# Patient Record
Sex: MALE | Race: BLACK OR AFRICAN AMERICAN | NOT HISPANIC OR LATINO | Employment: OTHER | URBAN - METROPOLITAN AREA
[De-identification: names, ages, dates, MRNs, and addresses within clinical notes are randomized per-mention and may not be internally consistent; named-entity substitution may affect disease eponyms.]

---

## 2024-08-06 ENCOUNTER — TELEPHONE (OUTPATIENT)
Dept: TRANSPLANT | Facility: CLINIC | Age: 59
End: 2024-08-06

## 2024-08-06 DIAGNOSIS — I50.9 CONGESTIVE HEART FAILURE, UNSPECIFIED HF CHRONICITY, UNSPECIFIED HEART FAILURE TYPE: Primary | ICD-10-CM

## 2024-09-10 ENCOUNTER — HOSPITAL ENCOUNTER (OUTPATIENT)
Dept: CARDIOLOGY | Facility: HOSPITAL | Age: 59
Discharge: HOME | End: 2024-09-10
Attending: INTERNAL MEDICINE
Payer: MEDICAID

## 2024-09-10 ENCOUNTER — OFFICE VISIT (OUTPATIENT)
Dept: TRANSPLANT | Facility: CLINIC | Age: 59
End: 2024-09-10
Attending: INTERNAL MEDICINE
Payer: MEDICAID

## 2024-09-10 VITALS
WEIGHT: 149.25 LBS | HEART RATE: 68 BPM | HEIGHT: 69 IN | SYSTOLIC BLOOD PRESSURE: 134 MMHG | DIASTOLIC BLOOD PRESSURE: 90 MMHG | BODY MASS INDEX: 22.11 KG/M2

## 2024-09-10 VITALS
HEIGHT: 69 IN | SYSTOLIC BLOOD PRESSURE: 127 MMHG | BODY MASS INDEX: 20.57 KG/M2 | WEIGHT: 138.88 LBS | HEART RATE: 60 BPM | DIASTOLIC BLOOD PRESSURE: 82 MMHG

## 2024-09-10 DIAGNOSIS — I50.9 CONGESTIVE HEART FAILURE, UNSPECIFIED HF CHRONICITY, UNSPECIFIED HEART FAILURE TYPE: ICD-10-CM

## 2024-09-10 DIAGNOSIS — I50.22 CHRONIC SYSTOLIC CONGESTIVE HEART FAILURE: Primary | ICD-10-CM

## 2024-09-10 DIAGNOSIS — I42.8 NICM (NONISCHEMIC CARDIOMYOPATHY): ICD-10-CM

## 2024-09-10 DIAGNOSIS — J44.9 COPD MIXED TYPE: ICD-10-CM

## 2024-09-10 DIAGNOSIS — F17.200 NEEDS SMOKING CESSATION EDUCATION: ICD-10-CM

## 2024-09-10 DIAGNOSIS — C61 PROSTATE CANCER: ICD-10-CM

## 2024-09-10 DIAGNOSIS — I10 PRIMARY HYPERTENSION: ICD-10-CM

## 2024-09-10 LAB
AORTIC ROOT ANNULUS: 2.2 CM
AV INDEX (PROSTH): 0.65
AV MEAN GRADIENT: 4 MMHG
AV PEAK GRADIENT: 8 MMHG
AV VALVE AREA BY VELOCITY RATIO: 3.28 CM²
AV VALVE AREA: 3.12 CM²
AV VELOCITY RATIO: 0.68
BSA FOR ECHO PROCEDURE: 1.75 M2
CV ECHO LV RWT: 0.24 CM
DOP CALC AO PEAK VEL: 1.37 M/S
DOP CALC AO VTI: 23.55 CM
DOP CALC LVOT AREA: 4.8 CM2
DOP CALC LVOT DIAMETER: 2.48 CM
DOP CALC LVOT PEAK VEL: 0.93 M/S
DOP CALC LVOT STROKE VOLUME: 73.48 CM3
DOP CALCLVOT PEAK VEL VTI: 15.22 CM
E WAVE DECELERATION TIME: 190.76 MSEC
E/A RATIO: 3.78
E/E' RATIO: 13.6 M/S
ECHO LV POSTERIOR WALL: 0.79 CM (ref 0.6–1.1)
FRACTIONAL SHORTENING: 14 % (ref 28–44)
INTERVENTRICULAR SEPTUM: 0.79 CM (ref 0.6–1.1)
IVC DIAMETER: 2.2 CM
IVRT: 68.51 MSEC
LA MAJOR: 5.47 CM
LA MINOR: 5.87 CM
LA WIDTH: 5.01 CM
LEFT ATRIUM SIZE: 4.79 CM
LEFT ATRIUM VOLUME INDEX MOD: 45.4 ML/M2
LEFT ATRIUM VOLUME INDEX: 65.3 ML/M2
LEFT ATRIUM VOLUME MOD: 80.33 CM3
LEFT ATRIUM VOLUME: 115.51 CM3
LEFT INTERNAL DIMENSION IN SYSTOLE: 5.74 CM (ref 2.1–4)
LEFT VENTRICLE DIASTOLIC VOLUME INDEX: 130.62 ML/M2
LEFT VENTRICLE DIASTOLIC VOLUME: 231.19 ML
LEFT VENTRICLE MASS INDEX: 126 G/M2
LEFT VENTRICLE SYSTOLIC VOLUME INDEX: 91.8 ML/M2
LEFT VENTRICLE SYSTOLIC VOLUME: 162.49 ML
LEFT VENTRICULAR INTERNAL DIMENSION IN DIASTOLE: 6.7 CM (ref 3.5–6)
LEFT VENTRICULAR MASS: 222.66 G
LV LATERAL E/E' RATIO: 11.33 M/S
LV SEPTAL E/E' RATIO: 17 M/S
MV A" WAVE DURATION": 23.69 MSEC
MV PEAK A VEL: 0.27 M/S
MV PEAK E VEL: 1.02 M/S
PISA MRMAX VEL: 0.05 M/S
PISA TR MAX VEL: 3.66 M/S
PULM VEIN S/D RATIO: 0.71
PV PEAK D VEL: 0.82 M/S
PV PEAK S VEL: 0.58 M/S
RA MAJOR: 5.43 CM
RA PRESSURE ESTIMATED: 8 MMHG
RA WIDTH: 4.7 CM
RIGHT VENTRICLE DIASTOLIC BASEL DIMENSION: 3.6 CM
RV TB RVSP: 12 MMHG
SINUS: 3.25 CM
STJ: 2.49 CM
TDI LATERAL: 0.09 M/S
TDI SEPTAL: 0.06 M/S
TDI: 0.08 M/S
TR MAX PG: 54 MMHG
TRICUSPID ANNULAR PLANE SYSTOLIC EXCURSION: 2.16 CM
TV REST PULMONARY ARTERY PRESSURE: 62 MMHG
Z-SCORE OF LEFT VENTRICULAR DIMENSION IN END DIASTOLE: 3.14
Z-SCORE OF LEFT VENTRICULAR DIMENSION IN END SYSTOLE: 5.06

## 2024-09-10 PROCEDURE — 4010F ACE/ARB THERAPY RXD/TAKEN: CPT | Mod: CPTII,,, | Performed by: INTERNAL MEDICINE

## 2024-09-10 PROCEDURE — 99205 OFFICE O/P NEW HI 60 MIN: CPT | Mod: S$PBB,,, | Performed by: INTERNAL MEDICINE

## 2024-09-10 PROCEDURE — 3080F DIAST BP >= 90 MM HG: CPT | Mod: CPTII,,, | Performed by: INTERNAL MEDICINE

## 2024-09-10 PROCEDURE — 99999 PR PBB SHADOW E&M-EST. PATIENT-LVL III: CPT | Mod: PBBFAC,,, | Performed by: INTERNAL MEDICINE

## 2024-09-10 PROCEDURE — 3008F BODY MASS INDEX DOCD: CPT | Mod: CPTII,,, | Performed by: INTERNAL MEDICINE

## 2024-09-10 PROCEDURE — 99213 OFFICE O/P EST LOW 20 MIN: CPT | Mod: PBBFAC,25 | Performed by: INTERNAL MEDICINE

## 2024-09-10 PROCEDURE — 1160F RVW MEDS BY RX/DR IN RCRD: CPT | Mod: CPTII,,, | Performed by: INTERNAL MEDICINE

## 2024-09-10 PROCEDURE — 1159F MED LIST DOCD IN RCRD: CPT | Mod: CPTII,,, | Performed by: INTERNAL MEDICINE

## 2024-09-10 PROCEDURE — 93306 TTE W/DOPPLER COMPLETE: CPT

## 2024-09-10 PROCEDURE — 93306 TTE W/DOPPLER COMPLETE: CPT | Mod: 26,,, | Performed by: STUDENT IN AN ORGANIZED HEALTH CARE EDUCATION/TRAINING PROGRAM

## 2024-09-10 PROCEDURE — 3075F SYST BP GE 130 - 139MM HG: CPT | Mod: CPTII,,, | Performed by: INTERNAL MEDICINE

## 2024-09-10 RX ORDER — BUMETANIDE 2 MG/1
2 TABLET ORAL 2 TIMES DAILY
COMMUNITY
Start: 2024-08-02

## 2024-09-10 RX ORDER — METOLAZONE 2.5 MG/1
2.5 TABLET ORAL
COMMUNITY
Start: 2024-08-02

## 2024-09-10 RX ORDER — IPRATROPIUM BROMIDE 0.5 MG/2.5ML
500 SOLUTION RESPIRATORY (INHALATION) EVERY 4 HOURS PRN
COMMUNITY

## 2024-09-10 RX ORDER — SPIRONOLACTONE 50 MG/1
50 TABLET, FILM COATED ORAL 2 TIMES DAILY
Qty: 60 TABLET | Refills: 5 | Status: SHIPPED | OUTPATIENT
Start: 2024-09-10

## 2024-09-10 RX ORDER — SACUBITRIL AND VALSARTAN 97; 103 MG/1; MG/1
1 TABLET, FILM COATED ORAL 2 TIMES DAILY
COMMUNITY

## 2024-09-10 RX ORDER — ISOSORBIDE DINITRATE 20 MG/1
20 TABLET ORAL 3 TIMES DAILY
Qty: 90 TABLET | Refills: 3 | Status: SHIPPED | OUTPATIENT
Start: 2024-09-10

## 2024-09-10 RX ORDER — COLCHICINE 0.6 MG/1
0.6 TABLET ORAL
COMMUNITY

## 2024-09-10 RX ORDER — POTASSIUM CHLORIDE 1500 MG/1
20 TABLET, EXTENDED RELEASE ORAL 2 TIMES DAILY
COMMUNITY
Start: 2024-08-02

## 2024-09-10 RX ORDER — NAPROXEN SODIUM 220 MG/1
81 TABLET, FILM COATED ORAL DAILY
COMMUNITY
End: 2024-09-10

## 2024-09-10 RX ORDER — CARVEDILOL 25 MG/1
25 TABLET ORAL 2 TIMES DAILY
COMMUNITY

## 2024-09-10 RX ORDER — ALBUTEROL SULFATE 90 UG/1
2 AEROSOL, METERED RESPIRATORY (INHALATION) 4 TIMES DAILY
COMMUNITY
Start: 2024-04-16

## 2024-09-10 RX ORDER — DAPAGLIFLOZIN 10 MG/1
10 TABLET, FILM COATED ORAL DAILY
COMMUNITY
Start: 2024-08-02

## 2024-09-10 RX ORDER — HYDRALAZINE HYDROCHLORIDE 50 MG/1
50 TABLET, FILM COATED ORAL 3 TIMES DAILY
Qty: 90 TABLET | Refills: 3 | Status: SHIPPED | OUTPATIENT
Start: 2024-09-10

## 2024-09-10 RX ORDER — SPIRONOLACTONE 50 MG/1
50 TABLET, FILM COATED ORAL DAILY
COMMUNITY
End: 2024-09-10 | Stop reason: SDUPTHER

## 2024-09-10 RX ORDER — OXYCODONE AND ACETAMINOPHEN 10; 325 MG/1; MG/1
1 TABLET ORAL
COMMUNITY

## 2024-09-10 RX ORDER — ALBUTEROL SULFATE 0.83 MG/ML
2.5 SOLUTION RESPIRATORY (INHALATION) EVERY 4 HOURS PRN
COMMUNITY
Start: 2024-03-06

## 2024-09-10 RX ORDER — AMIODARONE HYDROCHLORIDE 200 MG/1
200 TABLET ORAL DAILY
COMMUNITY
Start: 2024-08-02 | End: 2025-08-02

## 2024-09-10 NOTE — PATIENT INSTRUCTIONS
Increase the spironolactone to 1 tablet twice a day    Stop aspirin    Stop smoking and do not use nicotine gum or patches.  If nicotine detected in the blood then unable to evaluate you for a heart transplant for 6 months.  The tests are so sensitive do not even go by anyone who smokes    Reduce alcohol but eliminating a half a bottle of wine every week until off    Start hydralazine half of a 50 mg tablet three time a day for 2 weeks and then whole tablet three times a day    Start isosorbide half of a 20 mg tablet three time a day for 2 weeks and then whole tablet three times a day    I will make arrangements for you to return on these medication for tests to assess your risk profile

## 2024-09-15 PROBLEM — J44.1 COPD EXACERBATION: Status: ACTIVE | Noted: 2024-09-15

## 2024-09-15 PROBLEM — J44.9 COPD MIXED TYPE: Status: ACTIVE | Noted: 2024-09-15

## 2024-09-15 PROBLEM — I42.8 NICM (NONISCHEMIC CARDIOMYOPATHY): Status: ACTIVE | Noted: 2024-09-15

## 2024-09-15 PROBLEM — C61 PROSTATE CANCER: Status: ACTIVE | Noted: 2024-09-15

## 2024-09-15 PROBLEM — I50.22 CHRONIC SYSTOLIC CONGESTIVE HEART FAILURE: Status: ACTIVE | Noted: 2024-09-15

## 2024-09-15 PROBLEM — I10 PRIMARY HYPERTENSION: Status: ACTIVE | Noted: 2024-09-15

## 2024-09-16 NOTE — PROGRESS NOTES
Subjective:   Initial evaluation of heart transplant candidacy.    HPI:  Mr. Arizmendi is a 59 y.o. year old Black or  male who has presents to be considered for advanced surgical options (LVAD/OHT) upon referral from Dr. Segura, his cardiologist.  He has NICM and CHF dx in 2019.  He has RAND < 1 block, sleeps on 2 pillows and occ awakens with SOB, some swelling at end of day.  He has angio without CAD 2024.  He has COPD under care of pulmonologist at home.     Past medical history  Prostate CA 2017  CHF 2019  NICM (no CAD at cath 2024)  Labile HBP on treatment since 2022  COPD    Operations  Prostatectomy 2017  ICD 2023    Social history  Smoker since age 14 avg 1 ppd until this year though he continues to smoke few cigarettes/day  Alcohol since age 17 drinks 1-2 bottles of wine per day  Previously worked but not now though they c=give varying estimates of when he stopped    Family history  Negative for CHF    Current Outpatient Medications on File Prior to Visit   Medication Sig Dispense Refill    Aspirin 325 mg 1 daily      albuterol (PROVENTIL) 2.5 mg /3 mL (0.083 %) nebulizer solution Inhale 2.5 mg into the lungs every 4 (four) hours as needed.      amiodarone (PACERONE) 200 MG Tab Take 200 mg by mouth once daily.      bumetanide (BUMEX) 2 MG tablet Take 2 mg by mouth 2 (two) times a day.      carvediloL (COREG) 25 MG tablet Take 25 mg by mouth 2 (two) times daily.      colchicine (COLCRYS) 0.6 mg tablet Take 0.6 mg by mouth as needed. Take 2 with gout flare followed by 1 tablet 1 hr later      dapagliflozin propanediol (FARXIGA) 10 mg tablet Take 10 mg by mouth Daily.      ipratropium (ATROVENT) 0.02 % nebulizer solution Take 500 mcg by nebulization every 4 (four) hours as needed for Wheezing. Rescue      metOLazone (ZAROXOLYN) 2.5 MG tablet Take 2.5 mg by mouth every Monday. 30 min before bumetanide      oxyCODONE-acetaminophen (PERCOCET)  mg per tablet Take 1 tablet by mouth as needed.    "   potassium chloride (K-TAB) 20 mEq Take 20 mEq by mouth 2 (two) times a day.      sacubitriL-valsartan (ENTRESTO)  mg per tablet Take 1 tablet by mouth 2 (two) times daily.      VENTOLIN HFA 90 mcg/actuation inhaler Inhale 2 puffs into the lungs 4 (four) times daily.      vericiguat 10 mg Tab Take 10 mg by mouth.       Allergies  flexeril    Review of Systems   Constitutional: Positive for malaise/fatigue and weight loss.   Cardiovascular:  Positive for dyspnea on exertion, leg swelling (end of day), orthopnea (2 pillows) and paroxysmal nocturnal dyspnea (wakes up SOB at times). Negative for chest pain, near-syncope, palpitations and syncope.   Respiratory:  Positive for cough, shortness of breath and sputum production.    Hematologic/Lymphatic: Does not bruise/bleed easily.   Neurological:  Negative for brief paralysis, dizziness, focal weakness and light-headedness.       Objective:   Blood pressure (!) 134/90, pulse 68, height 5' 9" (1.753 m), weight 67.7 kg (149 lb 4 oz).body mass index is 22.04 kg/m².    Physical Exam  Constitutional:       Appearance: He is well-developed.      Comments: BP (!) 134/90 (BP Location: Left arm, Patient Position: Sitting, BP Method: Medium (Automatic))   Pulse 68   Ht 5' 9" (1.753 m)   Wt 67.7 kg (149 lb 4 oz)   BMI 22.04 kg/m²    HENT:      Head: Normocephalic and atraumatic.   Eyes:      General: No scleral icterus.        Right eye: No discharge.         Left eye: No discharge.      Conjunctiva/sclera: Conjunctivae normal.   Neck:      Thyroid: No thyromegaly.      Vascular: No JVD.      Trachea: No tracheal deviation.   Cardiovascular:      Rate and Rhythm: Normal rate and regular rhythm.      Heart sounds: No murmur heard.     No gallop.      Comments: Distant heart tones  Pulmonary:      Effort: Pulmonary effort is normal.      Comments: Quiet BS  Abdominal:      General: Bowel sounds are normal. There is no distension.      Palpations: Abdomen is soft. There is " no mass.      Tenderness: There is no abdominal tenderness. There is no guarding or rebound.   Musculoskeletal:         General: No swelling or tenderness.      Right lower leg: No edema.      Left lower leg: No edema.   Skin:     General: Skin is warm and dry.   Neurological:      General: No focal deficit present.      Mental Status: He is alert and oriented to person, place, and time. Mental status is at baseline.      Comments: Grossly intact   Psychiatric:         Mood and Affect: Mood normal.         Behavior: Behavior normal.         Thought Content: Thought content normal.         Judgment: Judgment normal.         Labs reviewed as follows:  Lab Results   Component Value Date    BNP 1,192 (H) 09/10/2024     09/10/2024    K 3.4 (L) 09/10/2024    MG 2.1 09/10/2024     (H) 09/10/2024    CO2 26 09/10/2024    BUN 11 09/10/2024    CREATININE 0.9 09/10/2024    GLU 88 09/10/2024    HGBA1C 5.4 06/24/2022    AST 15 09/10/2024    ALT 11 09/10/2024    ALBUMIN 3.6 09/10/2024    PROT 6.5 09/10/2024    BILITOT 0.8 09/10/2024    TSH 0.786 09/10/2024    CHOL 144 09/29/2023    HDL 75 09/29/2023    LDLCALC 56 09/29/2023    TRIG 62 09/29/2023     Assessment:      1. Chronic systolic congestive heart failure    2. NICM (nonischemic cardiomyopathy)    3. Primary hypertension    4. COPD mixed type    5. Prostate cancer        Plan:   Increase the spironolactone to 25 mg twice a day    Stop aspirin    Stop smoking and do not use nicotine gum or patches.  If nicotine detected in the blood then unable to evaluate you for a heart transplant for 6 months.  The tests are so sensitive do not even go by anyone who smokes    Reduce alcohol but eliminating a half a bottle of wine every week until off    Start hydralazine half of a 50 mg tablet three time a day for 2 weeks and then whole tablet three times a day    Start isosorbide half of a 20 mg tablet three time a day for 2 weeks and then whole tablet three times a day    RTC  4-6 weeks for risk stratification with EKG, CPX and RHC and also obtain CBC, BMP, BNP, iron, TIBC, % sat and ferritin    Patient is now NYHA III ACC stage D    Recommend 2 gram sodium restriction and 1500cc fluid restriction.  Encourage physical activity with graded exercise program.  Requested patient to weigh themselves daily, and to notify us if their weight increases by more than 3 lbs in 1 day or 5 lbs in 1 week.     Transplant Candidacy: Patient is a 59 y.o. year old male with heart failure is being seen for possible LVAD and OHT. In my opinion, he is  an unacceptable OHT candidate due to smoking but might be candidate for LVAD--would like to see him tapered off alcohol or at least substantial reduction. Patient did not meet with MCS and/or pre-transplant coordinator at the end of this visit as they wanted to get on the road with impending storm. he is scheduled for risk stratification testing with CPX/RHC.     Discussed with the patient and family [facility] program outcomes as reported in INTERMACS (Interagency Registry for Mechanically Assisted Circulatory Support):    1 year survival = 92.7%  2 year survival = 86.7%  3 year survival = 86.7%    Patient and family acknowledged receipt of this information and all questions were answered.     Patient advised that it is recommended that all transplant candidates, and their close contacts and household members receive Covid vaccination.    UNOS Patient Status  Functional Status: 50% - Requires considerable assistance and frequent medical care  Physical Capacity: No Limitations  Working for Income: No  If no, reason not working: Unknown    Advance Care Planning     Date: 09/10/2024    Power of   I initiated the process of voluntary advance care planning today and explained the importance of this process to the patient.  I introduced the concept of advance directives to the patient, as well. Then the patient received detailed  information about the importance of designating a Health Care Power of  (HCPOA). He was also instructed to communicate with this person about their wishes for future healthcare, should he become sick and lose decision-making capacity. The patient has not previously appointed a HCPOA but will do so at next visit.      A total of 5 min was spent on advance care planning, goals of care discussion, emotional support, formulating and communicating prognosis and exploring burden/benefit of various approaches of treatment. This discussion occurred on a fully voluntary basis with the verbal consent of the patient and/or family.       Cj Rudd Jr, MD

## 2024-09-24 ENCOUNTER — CLINICAL SUPPORT (OUTPATIENT)
Dept: SMOKING CESSATION | Facility: CLINIC | Age: 59
End: 2024-09-24
Payer: COMMERCIAL

## 2024-09-24 DIAGNOSIS — F17.200 NICOTINE DEPENDENCE: Primary | ICD-10-CM

## 2024-09-24 PROCEDURE — 99404 PREV MED CNSL INDIV APPRX 60: CPT | Mod: S$GLB,,, | Performed by: GENERAL PRACTICE

## 2024-09-24 PROCEDURE — 99999 PR PBB SHADOW E&M-EST. PATIENT-LVL II: CPT | Mod: PBBFAC,,,

## 2024-09-24 RX ORDER — MICONAZOLE NITRATE 2 %
2 CREAM (GRAM) TOPICAL
Qty: 220 EACH | Refills: 0 | Status: SHIPPED | OUTPATIENT
Start: 2024-09-24

## 2024-09-24 RX ORDER — IBUPROFEN 200 MG
1 TABLET ORAL DAILY
Qty: 28 PATCH | Refills: 0 | Status: SHIPPED | OUTPATIENT
Start: 2024-09-24

## 2024-10-08 ENCOUNTER — CLINICAL SUPPORT (OUTPATIENT)
Dept: SMOKING CESSATION | Facility: CLINIC | Age: 59
End: 2024-10-08
Payer: COMMERCIAL

## 2024-10-08 DIAGNOSIS — F17.200 NICOTINE DEPENDENCE: ICD-10-CM

## 2024-10-08 PROCEDURE — 99999 PR PBB SHADOW E&M-EST. PATIENT-LVL II: CPT | Mod: PBBFAC,,,

## 2024-10-08 PROCEDURE — 99404 PREV MED CNSL INDIV APPRX 60: CPT | Mod: S$GLB,,, | Performed by: GENERAL PRACTICE

## 2024-10-08 RX ORDER — IBUPROFEN 200 MG
1 TABLET ORAL DAILY
Qty: 28 PATCH | Refills: 0 | Status: SHIPPED | OUTPATIENT
Start: 2024-10-08

## 2024-10-08 NOTE — PROGRESS NOTES
Individual Follow-Up Form    10/8/2024    Quit Date: TBD    Clinical Status of Patient: Outpatient    Length of Service: 60 minutes    Continuing Medication: yes  Patches    Other Medications: nicotine gum     Target Symptoms: Withdrawal and medication side effects. The following were  rated moderate (3) to severe (4) on TCRS:  Moderate (3): none  Severe (4): none    Comments: Patient was seen in the clinic today for a smoking cessation progress update. He has reduced his smoking and was smoking 20 cpd. Commended patient on his progress toward quitting tobacco use. Exhaled CO 16 ppm. Patient states the nicotine patch and gum has been helping to decrease his craving to smoke. The apartment complex where he lives someone is always smoking outside and asking for a cigarette and makes it difficult to avoid being around smoke. Discussed the decision to quit, setting a quit date, tobacco dependence, nicotine vs. habit, triggers and cues, consequences of tobacco use, personal reasons for quitting, and strategies to eliminate tobacco use. The patient remains on the prescribed tobacco cessation medication regimen of 21 mg nicotine patch QD and 2 mg nicotine gum as needed without any negative side effects at this time.   Challenged patient to attempt 24 hours without smoking and not buy any more cigarettes. The patient denies any abnormal behavioral or mental changes at this time.  Will continue to encourage and monitor his progress.     Diagnosis: F17.200    Next Visit: 2 weeks

## 2024-10-16 ENCOUNTER — HOSPITAL ENCOUNTER (OUTPATIENT)
Dept: CARDIOLOGY | Facility: HOSPITAL | Age: 59
Discharge: HOME | End: 2024-10-16
Attending: INTERNAL MEDICINE
Payer: COMMERCIAL

## 2024-10-16 ENCOUNTER — HOSPITAL ENCOUNTER (OUTPATIENT)
Dept: CARDIOLOGY | Facility: CLINIC | Age: 59
Discharge: HOME | End: 2024-10-16
Attending: INTERNAL MEDICINE
Payer: COMMERCIAL

## 2024-10-16 VITALS
BODY MASS INDEX: 21.92 KG/M2 | SYSTOLIC BLOOD PRESSURE: 135 MMHG | DIASTOLIC BLOOD PRESSURE: 88 MMHG | HEART RATE: 60 BPM | HEIGHT: 69 IN | WEIGHT: 148 LBS

## 2024-10-16 DIAGNOSIS — I50.22 CHRONIC SYSTOLIC CONGESTIVE HEART FAILURE: ICD-10-CM

## 2024-10-16 LAB
CV STRESS BASE HR: 60 BPM
DIASTOLIC BLOOD PRESSURE: 88 MMHG
OHS CV CPX 1 MINUTE RECOVERY HEART RATE: 69 BPM
OHS CV CPX 85 PERCENT MAX PREDICTED HEART RATE MALE: 136
OHS CV CPX DATA GRADE - PEAK: 1.1
OHS CV CPX DATA O2 SAT - PEAK: 99
OHS CV CPX DATA O2 SAT - REST: 100
OHS CV CPX DATA SPEED - PEAK: 1.3
OHS CV CPX DATA TIME - PEAK: 1.85
OHS CV CPX DATA VE/VCO2 - PEAK: 46
OHS CV CPX DATA VE/VO2 - PEAK: 32
OHS CV CPX DATA VO2 - PEAK: 12.5
OHS CV CPX DATA VO2 - REST: 4.5
OHS CV CPX FEV1/FVC: 0.45
OHS CV CPX FORCED EXPIRATORY VOLUME: 1.23
OHS CV CPX FORCED VITAL CAPACITY (FVC): 2.76
OHS CV CPX HIGHEST VO: 33.1
OHS CV CPX MAX PREDICTED HEART RATE: 160
OHS CV CPX MAXIMAL VOLUNTARY VENTILATION (MVV) PREDICTED: 49.2
OHS CV CPX MAXIMAL VOLUNTARY VENTILATION (MVV): 37
OHS CV CPX MAXIUMUM EXERCISE VENTILATION (VE MAX): 13.6
OHS CV CPX PATIENT AGE: 60
OHS CV CPX PATIENT HEIGHT IN: 69
OHS CV CPX PATIENT IS FEMALE AGE 11-19: 0
OHS CV CPX PATIENT IS FEMALE AGE GREATER THAN 19: 0
OHS CV CPX PATIENT IS FEMALE AGE LESS THAN 11: 0
OHS CV CPX PATIENT IS FEMALE: 0
OHS CV CPX PATIENT IS MALE AGE 11-25: 0
OHS CV CPX PATIENT IS MALE AGE GREATER THAN 25: 1
OHS CV CPX PATIENT IS MALE AGE LESS THAN 11: 0
OHS CV CPX PATIENT IS MALE GREATER THAN 18: 1
OHS CV CPX PATIENT IS MALE LESS THAN OR EQUAL TO 18: 0
OHS CV CPX PATIENT IS MALE: 1
OHS CV CPX PATIENT WEIGHT RETURNED IN OZ: 2368
OHS CV CPX PEAK DIASTOLIC BLOOD PRESSURE: 82 MMHG
OHS CV CPX PEAK HEAR RATE: 98 BPM
OHS CV CPX PEAK RATE PRESSURE PRODUCT: NORMAL
OHS CV CPX PEAK SYSTOLIC BLOOD PRESSURE: 126 MMHG
OHS CV CPX PERCENT BODY FAT: 8.5
OHS CV CPX PERCENT MAX PREDICTED HEART RATE ACHIEVED: 61
OHS CV CPX PREDICTED VO2: 33.1 ML/KG/MIN
OHS CV CPX RATE PRESSURE PRODUCT PRESENTING: 8100
OHS CV CPX REST PET CO2: 26
OHS CV CPX VE/VCO2 SLOPE: 41.8
OHS QRS DURATION: 96 MS
OHS QTC CALCULATION: 476 MS
STRESS ECHO POST EXERCISE DUR MIN: 1 MINUTES
STRESS ECHO POST EXERCISE DUR SEC: 51 SECONDS
SYSTOLIC BLOOD PRESSURE: 135 MMHG

## 2024-10-16 PROCEDURE — 94621 CARDIOPULM EXERCISE TESTING: CPT

## 2024-10-16 PROCEDURE — 94621 CARDIOPULM EXERCISE TESTING: CPT | Mod: 26,,, | Performed by: INTERNAL MEDICINE

## 2024-10-16 PROCEDURE — 93005 ELECTROCARDIOGRAM TRACING: CPT | Mod: S$GLB,,, | Performed by: INTERNAL MEDICINE

## 2024-10-16 PROCEDURE — 93010 ELECTROCARDIOGRAM REPORT: CPT | Mod: S$GLB,,, | Performed by: INTERNAL MEDICINE

## 2024-10-18 ENCOUNTER — TELEPHONE (OUTPATIENT)
Dept: TRANSPLANT | Facility: CLINIC | Age: 59
End: 2024-10-18
Payer: COMMERCIAL

## 2024-10-18 ENCOUNTER — PATIENT MESSAGE (OUTPATIENT)
Dept: TRANSPLANT | Facility: CLINIC | Age: 59
End: 2024-10-18
Payer: COMMERCIAL

## 2024-10-18 NOTE — TELEPHONE ENCOUNTER
NN spoke with patient regarding his [employer] insurance plan being out of network and the need to cancel upcoming RHC appt. Patient states his insurance just changed to a [employer] plan, that he no longer has [employer] coverage. Asked patient to send insurance documentation in patient portal so that we could verify coverage.     NN updated JOSE Segovia, of potential insurance change. We will hold of on cancelling his RHC scheduled for 10/25 while we sort out insurance coverage. If his new plan is in network, we will proceed with appts.

## 2024-10-18 NOTE — TELEPHONE ENCOUNTER
Per Dr. Rudd, capture txp referral and request financial clearance for consult.     Patient is scheduled to have RHC done 10/25/2024.

## 2024-10-21 ENCOUNTER — TELEPHONE (OUTPATIENT)
Dept: TRANSPLANT | Facility: CLINIC | Age: 59
End: 2024-10-21
Payer: COMMERCIAL

## 2024-10-21 NOTE — TELEPHONE ENCOUNTER
Patient provided new insurance card information via patient portal. JOSE Segovia reviewed coverage. [employer] plan is still showing as eligible/active, and [facility] is out of network. Per FC, will need to cancel future appts at [facility] for the time being. Will plan to reassess coverage on 11/1 to see if [employer] is no longer active.     Called patient to inform him of insurance coverage issues and our plan to reassess on 11/1. Explained that at this time, [facility] is out of network and he would be a self-pay for his appts. To avoid excessive cost for the patient, we are going to cancel his upcoming appts for now with hopes to be able to reschedule in the future. If new [facility] plan shows as active and [employer] is inactive, we can then proceed with rescheduling [facility] appts. Pt verbalized understanding.

## 2024-10-22 ENCOUNTER — CLINICAL SUPPORT (OUTPATIENT)
Dept: SMOKING CESSATION | Facility: CLINIC | Age: 59
End: 2024-10-22
Payer: COMMERCIAL

## 2024-10-22 DIAGNOSIS — F17.200 NICOTINE DEPENDENCE: Primary | ICD-10-CM

## 2024-10-22 PROCEDURE — 99999 PR PBB SHADOW E&M-EST. PATIENT-LVL II: CPT | Mod: PBBFAC,,,

## 2024-10-22 PROCEDURE — 99404 PREV MED CNSL INDIV APPRX 60: CPT | Mod: S$GLB,,, | Performed by: GENERAL PRACTICE

## 2024-10-22 NOTE — PROGRESS NOTES
Individual Follow-Up Form    10/22/2024    Quit Date: TBD    Clinical Status of Patient: Outpatient    Length of Service: 60 minutes    Continuing Medication: yes  Patches    Other Medications: nicotine gum     Target Symptoms: Withdrawal and medication side effects. The following were  rated moderate (3) to severe (4) on TCRS:  Moderate (3): none  Severe (4): none    Comments: Patient was seen in the clinic today for a smoking cessation follow up visit. He is smoking 3-4 cpd and was smoking 20 cpd. Commended patient on his progress towards quitting tobacco use. Exhaled CO is 7 ppm. He has not smoked a cigarette today. He smoked the last cigarette yesterday. He has not been feeling well with cough and congestion and that has been making him not feel like smoking. Patient states that he only uses the 21 mg nicotine patch on some days because he forgets to wear it with no side effects. He continues to use 2 mg nicotine gum as needed with no side effects at this time. Completion of TCRS (Tobacco Cessation Rating Scale) reviewed strategies, cues, and triggers. Introduced the negative impact of tobacco on health, the health advantages of discontinuing the use of tobacco, time line improved health changes after a quit, withdrawal issues to expect from nicotine and habit, and ways to achieve the goal of a quit. Challenged patient to attempt 24 hours without smoking and set a quit date. Discussed consistent use of nicotine patch. The patient denies any abnormal behavioral or mental changes at this time.  Will continue to encourage and monitor his progress.     Diagnosis: F17.200    Next Visit: 2 weeks

## 2024-11-01 ENCOUNTER — TELEPHONE (OUTPATIENT)
Dept: TRANSPLANT | Facility: CLINIC | Age: 59
End: 2024-11-01
Payer: MEDICAID

## 2024-11-01 ENCOUNTER — TELEPHONE (OUTPATIENT)
Dept: ADMINISTRATIVE | Facility: HOSPITAL | Age: 59
End: 2024-11-01
Payer: MEDICAID

## 2024-11-01 ENCOUNTER — PATIENT MESSAGE (OUTPATIENT)
Dept: TRANSPLANT | Facility: CLINIC | Age: 59
End: 2024-11-01
Payer: MEDICAID

## 2024-11-05 ENCOUNTER — CLINICAL SUPPORT (OUTPATIENT)
Dept: SMOKING CESSATION | Facility: CLINIC | Age: 59
End: 2024-11-05
Payer: COMMERCIAL

## 2024-11-05 DIAGNOSIS — F17.200 NICOTINE DEPENDENCE: Primary | ICD-10-CM

## 2024-11-05 PROCEDURE — 99999 PR PBB SHADOW E&M-EST. PATIENT-LVL II: CPT | Mod: PBBFAC,TXP,,

## 2024-11-05 PROCEDURE — 99404 PREV MED CNSL INDIV APPRX 60: CPT | Mod: S$GLB,TXP,, | Performed by: GENERAL PRACTICE

## 2024-11-05 RX ORDER — IBUPROFEN 200 MG
1 TABLET ORAL DAILY
Qty: 14 PATCH | Refills: 0 | Status: SHIPPED | OUTPATIENT
Start: 2024-11-05

## 2024-11-05 NOTE — PROGRESS NOTES
Individual Follow-Up Form    11/5/2024    Quit Date: 11/1/2024    Clinical Status of Patient: Outpatient    Length of Service: 60 minutes    Continuing Medication: yes  Patches    Other Medications: nicotine gum     Target Symptoms: Withdrawal and medication side effects. The following were  rated moderate (3) to severe (4) on TCRS:  Moderate (3): none  Severe (4): none    Comments: Patient was seen in the clinic today for a smoking cessation follow up visit. He is tobacco free at this time and was smoking 20 cpd. Congratulated patient on his accomplishment. Patient states he has been having a cold and that has helped him to not want to smoke. Exhaled CO 6 ppm. He is no longer buying cigarettes and the people in his apartment complex have noticed he is not smoking so they have stopped asking him for cigarettes. He is no longer having strong cravings to smoke. Completion of TCRS (Tobacco Cessation Rating Scale) reviewed strategies, controlling environment, cues, triggers, new goals set. Introduced high risk situations with preparation interventions, caffeine similarities with withdrawal issues of habit and nicotine, alcohol, understanding urges, cravings, stress and relaxation. Open discussion with intervention discussion. Discussed lowering nicotine patch dose to 14 mg. The patient remains on the prescribed tobacco cessation medication regimen of 21 mg nicotine patch QD and 2 mg nicotine gum as needed without any negative side effects at this time.  The patient denies any abnormal behavioral or mental changes at this time.  Will continue to encourage and monitor his progress.     Diagnosis: F17.200    Next Visit: 2 weeks

## 2024-11-18 ENCOUNTER — TELEPHONE (OUTPATIENT)
Dept: SMOKING CESSATION | Facility: CLINIC | Age: 59
End: 2024-11-18
Payer: MEDICAID

## 2024-11-18 NOTE — TELEPHONE ENCOUNTER
Attempted to reach patient in regard to missed smoking cessation appointment. Left voicemail with return contact information.

## 2024-11-25 ENCOUNTER — CLINICAL SUPPORT (OUTPATIENT)
Dept: SMOKING CESSATION | Facility: CLINIC | Age: 59
End: 2024-11-25
Payer: COMMERCIAL

## 2024-11-25 DIAGNOSIS — F17.200 NICOTINE DEPENDENCE: Primary | ICD-10-CM

## 2024-11-25 PROCEDURE — 99402 PREV MED CNSL INDIV APPRX 30: CPT | Mod: S$GLB,TXP,, | Performed by: GENERAL PRACTICE

## 2024-11-25 PROCEDURE — 99999 PR PBB SHADOW E&M-EST. PATIENT-LVL II: CPT | Mod: PBBFAC,TXP,,

## 2024-11-25 RX ORDER — NICOTINE 7MG/24HR
1 PATCH, TRANSDERMAL 24 HOURS TRANSDERMAL DAILY
Qty: 14 PATCH | Refills: 0 | Status: SHIPPED | OUTPATIENT
Start: 2024-11-25

## 2024-11-25 NOTE — PROGRESS NOTES
Individual Follow-Up Form    11/25/2024    Quit Date: 11/1/24    Clinical Status of Patient: Outpatient    Length of Service: 30 minutes    Continuing Medication: yes  Patches    Other Medications: nicotine gum     Target Symptoms: Withdrawal and medication side effects. The following were  rated moderate (3) to severe (4) on TCRS:  Moderate (3): none  Severe (4): none    Comments: Spoke to patient over the phone today in regard to his smoking cessation progress. He is tobacco free at this time. Patient states he has smoked every now and then, but is now smoke free. Congratulated patient on quitting tobacco use and encouraged him to continue with the progress he has made. He is no longer having strong cravings to smoke. Patient states he had a visit with his cardiologist last week and they advised him to stop smoking for his heart health. Discussed relapse prevention and healthy substitutions. The patient remains on the prescribed tobacco cessation medication regimen of 14 mg nicotine patch QD and 2 mg nicotine gum as needed without any negative side effects at this time.  Discussed lowering nicotine patch dose to 7 mg today. The patient denies any abnormal behavioral or mental changes at this time.  Will continue to encourage and monitor his progress.    Diagnosis: F17.200    Next Visit: 2 weeks

## 2024-12-04 ENCOUNTER — HOSPITAL ENCOUNTER (OUTPATIENT)
Facility: HOSPITAL | Age: 59
Discharge: HOME | End: 2024-12-04
Attending: INTERNAL MEDICINE | Admitting: INTERNAL MEDICINE
Payer: MEDICAID

## 2024-12-04 VITALS
HEART RATE: 66 BPM | BODY MASS INDEX: 21.91 KG/M2 | TEMPERATURE: 99 F | RESPIRATION RATE: 18 BRPM | WEIGHT: 147.94 LBS | DIASTOLIC BLOOD PRESSURE: 85 MMHG | OXYGEN SATURATION: 99 % | SYSTOLIC BLOOD PRESSURE: 140 MMHG | HEIGHT: 69 IN

## 2024-12-04 DIAGNOSIS — I50.9 CONGESTIVE HEART FAILURE, UNSPECIFIED HF CHRONICITY, UNSPECIFIED HEART FAILURE TYPE: ICD-10-CM

## 2024-12-04 PROCEDURE — C1751 CATH, INF, PER/CENT/MIDLINE: HCPCS | Mod: TXP | Performed by: INTERNAL MEDICINE

## 2024-12-04 PROCEDURE — 93451 RIGHT HEART CATH: CPT | Mod: 26,NTX,, | Performed by: INTERNAL MEDICINE

## 2024-12-04 PROCEDURE — C1894 INTRO/SHEATH, NON-LASER: HCPCS | Mod: TXP | Performed by: INTERNAL MEDICINE

## 2024-12-04 PROCEDURE — 93451 RIGHT HEART CATH: CPT | Mod: TXP | Performed by: INTERNAL MEDICINE

## 2024-12-04 NOTE — H&P
[facility] - Short Stay Cardiac Unit  Interventional Cardiology  H&P    Patient Name: Kuldeep Arizmendi  MRN: [id number]  Admission Date: 12/4/2024  Code Status: No Order   Attending Provider: Laron Juares, *   Primary Care Physician: No, Primary Doctor  Principal Problem:<principal problem not specified>    Patient information was obtained from ER records.     Subjective:     Chief Complaint:  dyspnea     HPI: patient with HF here for rhc.      Past Medical History:   Diagnosis Date    Chronic systolic congestive heart failure 09/15/2024    COPD mixed type 09/15/2024    Gout, unspecified     NICM (nonischemic cardiomyopathy) 09/15/2024    Primary hypertension 09/15/2024    Prostate cancer 09/15/2024       Past Surgical History:   Procedure Laterality Date    INSERTION OF GENERATOR FOR SINGLE CHAMBER IMPLANTABLE CARDIOVERTER-DEFIBRILLATOR (ICD)      PROSTATECTOMY  2017       Review of patient's allergies indicates:   Allergen Reactions    Flexeril [cyclobenzaprine]        PTA Medications   Medication Sig    albuterol (PROVENTIL) 2.5 mg /3 mL (0.083 %) nebulizer solution Inhale 2.5 mg into the lungs every 4 (four) hours as needed.    amiodarone (PACERONE) 200 MG Tab Take 200 mg by mouth once daily.    bumetanide (BUMEX) 2 MG tablet Take 2 mg by mouth 2 (two) times a day.    carvediloL (COREG) 25 MG tablet Take 25 mg by mouth 2 (two) times daily.    colchicine (COLCRYS) 0.6 mg tablet Take 0.6 mg by mouth as needed. Take 2 with gout flare followed by 1 tablet 1 hr later    dapagliflozin propanediol (FARXIGA) 10 mg tablet Take 10 mg by mouth Daily.    hydrALAZINE (APRESOLINE) 50 MG tablet Take 1 tablet (50 mg total) by mouth 3 (three) times daily. Start hydralazine half of a 50 mg tablet three time a day for 2 weeks and then whole tablet three times a day    ipratropium (ATROVENT) 0.02 % nebulizer solution Take 500 mcg by nebulization every 4 (four) hours as needed for Wheezing. Rescue    metOLazone (ZAROXOLYN)  2.5 MG tablet Take 2.5 mg by mouth every Monday. 30 min before bumetanide    nicotine (NICODERM CQ) 14 mg/24 hr Place 1 patch onto the skin once daily.    nicotine (NICODERM CQ) 7 mg/24 hr Place 1 patch onto the skin once daily.    nicotine, polacrilex, (NICORETTE) 2 mg Gum Take 1 each (2 mg total) by mouth as needed (use 8-10 pieces of gum per day in the place of cigarettes).    sacubitriL-valsartan (ENTRESTO)  mg per tablet Take 1 tablet by mouth 2 (two) times daily.    spironolactone (ALDACTONE) 50 MG tablet Take 1 tablet (50 mg total) by mouth 2 (two) times daily.    VENTOLIN HFA 90 mcg/actuation inhaler Inhale 2 puffs into the lungs 4 (four) times daily.    vericiguat 10 mg Tab Take 10 mg by mouth.    isosorbide dinitrate (ISORDIL) 20 MG tablet Take 1 tablet (20 mg total) by mouth 3 (three) times daily. Start half of a 20 mg tablet three time a day for 2 weeks and then whole tablet three times a day    oxyCODONE-acetaminophen (PERCOCET)  mg per tablet Take 1 tablet by mouth as needed.    potassium chloride (K-TAB) 20 mEq Take 20 mEq by mouth 2 (two) times a day.     Family History       Problem Relation (Age of Onset)    Cancer Sister, Brother    Diabetes Mother    Heart disease Mother, Sister          Tobacco Use    Smoking status: Former     Current packs/day: 0.00     Average packs/day: 1 pack/day for 45.8 years (45.8 ttl pk-yrs)     Types: Cigarettes     Start date:      Quit date: 2024     Years since quittin.0     Passive exposure: Never    Smokeless tobacco: Never   Substance and Sexual Activity    Alcohol use: Yes     Comment: 1-2 bottles of wine a day since age 14    Drug use: Yes     Types: Oxycodone     Comment: as needed for back doctor's prescription    Sexual activity: Not Currently     Partners: Female     ROS  Objective:     Vital Signs (Most Recent):    Vital Signs (24h Range):           There is no height or weight on file to calculate BMI.            No intake or  output data in the 24 hours ending 12/04/24 1029    Lines/Drains/Airways       None                   Physical Exam    Significant Labs: CBC   Recent Labs   Lab 12/04/24  0808   WBC 5.25   HGB 12.7*   HCT 38.7*   *       Significant Imaging: Echocardiogram: 2D echo with color flow doppler: Echo results to be reported separately.   Assessment and Plan:     There are no hospital problems to display for this patient.      Patient agrees.    VTE Risk Mitigation (From admission, onward)      None            Laron Hopkins MD  Interventional Cardiology   [facility] - Short Stay Cardiac Unit

## 2024-12-04 NOTE — Clinical Note
The PA catheter was repositioned to the main pulmonary artery. Hemodynamics were performed. O2 saturation was measured at 53%. AO: 96  CO: 3.25  CI: 1.79

## 2024-12-04 NOTE — DISCHARGE SUMMARY
[facility] - Cath Lab  Discharge Note  Short Stay    Procedure(s) (LRB):  INSERTION, CATHETER, RIGHT HEART (Right)      OUTCOME: Patient tolerated treatment/procedure well without complication and is now ready for discharge.    DISPOSITION: Home or Self Care    FINAL DIAGNOSIS:  <principal problem not specified>    FOLLOWUP: In clinic    DISCHARGE INSTRUCTIONS:  No discharge procedures on file.     TIME SPENT ON DISCHARGE: 20 minutes

## 2024-12-04 NOTE — DISCHARGE INSTRUCTIONS
Instructions    AFTER THE PROCEDURE:   -You may remove the bandage in 24 hours and wash with soap and water.   -You may shower, but do not soak in a tub for three days.   PRECAUTIONS FOR THE NEXT 24 HOURS:   -If you need to cough, sneeze, have a bowel movement, or bear down, hold pressure over your bandage.   -Do not  anything heavier than a gallon of milk(about 5 pounds)   -Avoid excessive bending over.   SYMPTOMS TO WATCH FOR AND REPORT TO YOUR DOCTOR:   -BLEEDING: hold pressure over the site until bleeding stops. Proceed to Emergency Room by ambulance (do not drive yourself) if unable to stop bleeding. Notify your doctor.   -HEMATOMA(hard bruise under the skin): Yahir around the bruise if one develops. Call your doctor if it increases in size or if you have difficulty talking, swallowing, breathing or anything unusual.   SIGNS OF INFECTION:Fever (temperature over 100.5 F), pus or redness   -RASH   -CHEST PAIN OR SHORTNESS OF BREATH   You may call you coordinator in the Heart Failure/Heart Transplant/Pulmonary Hypertension Clinic at [phone] during normal business hours(Monday through Friday from 8 A.M. to 5 P.M.) After hours, call the Heart Transplant Service doctor on call at [phone]

## 2024-12-04 NOTE — PLAN OF CARE
Patient discharged per MD orders. Instructions given on medications, wound care, activity, signs of infection, when to call MD, and follow up appointments. Pt verbalized understanding.  Patient AAOx4, VSS, no c/o pain or discomfort at this time. Gauze/transparent dressing to right neck is c/d/I. No active bleeding. No hematoma noted. . Patient discharged home with his wife.

## 2024-12-04 NOTE — PLAN OF CARE
Received report from cath lab, RN. Pt is s/p RHC. Pt is aaox4. Vss. Resp even and unlabored. Pt in recliner chair. Wheels locked. Nurse call bell within reach. Post procedure protocol reviewed with patient. Understanding verbalize. Will monitor

## 2024-12-04 NOTE — PLAN OF CARE
Patient arrived to room. assessment completed. Plan of care discussed with patient. Will monitor. Call light within reach, instructed in use.

## 2024-12-09 ENCOUNTER — TELEPHONE (OUTPATIENT)
Dept: SMOKING CESSATION | Facility: CLINIC | Age: 59
End: 2024-12-09
Payer: MEDICAID

## 2024-12-09 ENCOUNTER — CLINICAL SUPPORT (OUTPATIENT)
Dept: SMOKING CESSATION | Facility: CLINIC | Age: 59
End: 2024-12-09
Payer: COMMERCIAL

## 2024-12-09 DIAGNOSIS — F17.200 NICOTINE DEPENDENCE: Primary | ICD-10-CM

## 2024-12-09 DIAGNOSIS — I42.8 NICM (NONISCHEMIC CARDIOMYOPATHY): Primary | ICD-10-CM

## 2024-12-09 PROCEDURE — 99402 PREV MED CNSL INDIV APPRX 30: CPT | Mod: S$GLB,TXP,, | Performed by: GENERAL PRACTICE

## 2024-12-09 PROCEDURE — 99999 PR PBB SHADOW E&M-EST. PATIENT-LVL I: CPT | Mod: PBBFAC,TXP,,

## 2024-12-09 NOTE — PROGRESS NOTES
Individual Follow-Up Form    12/9/2024    Quit Date: 11/1/24    Clinical Status of Patient: Outpatient    Length of Service: 30 minutes    Continuing Medication: yes  Patches    Other Medications: nicotine gum     Target Symptoms: Withdrawal and medication side effects. The following were  rated moderate (3) to severe (4) on TCRS:  Moderate (3): none  Severe (4): none    Comments: Spoke to patient over the phone in regard to his smoking cessation progress. Patient states he smokes every now and then, but he is not smoking like he used to. Commended patient on his progress towards quitting tobacco use. Discussed health benefits of quitting tobacco use and coping strategies. The patient remains on the prescribed tobacco cessation medication regimen of 14 mg nicotine patch QD and 2 mg nicotine gum as needed without any negative side effects at this time.  Encouraged patient to set a quit date. He will taper down to 7 mg nicotine patch this week. The patient denies any abnormal behavioral or mental changes at this time.  Will continue to encourage and monitor his progress.     Diagnosis: F17.200    Next Visit: 2 weeks

## 2024-12-09 NOTE — TELEPHONE ENCOUNTER
Completed a review of recent clinical updates with Dr Rudd including RHC, CPX, lab results and Vital signs.  Dr Rudd would like Mr Kuldeep Arizmendi to make the following medication changes:  1) Carvedilol 50 mg po bid starting now  2) in 2 weeks Hydralazine to 75 mg po tid  3) in 4 weeks, isosorbide 40 mg po tid    Pt will FU in HF section with Dr Rudd in 2-3 months.  Pt should be added to High Risk preht list.

## 2024-12-09 NOTE — TELEPHONE ENCOUNTER
TC spoke with patient to review current medication regimen and to review medication changes ordered by Dr. Rudd.     Current regimen:  Carvedilol 25 mg BID  Hydralazine 50 mg TID  Isosorbide dinitrate 20 mg TID    Advised patient to increase Carvedilol to 50 mg BID at this time. Let him know that one of the HF nurses would be in contact with him to make additional changes to his Hydralazine dose in 2 weeks, and Isosorbide in 4 weeks.

## 2024-12-09 NOTE — TELEPHONE ENCOUNTER
Attempted to reach patient for scheduled telephone follow up. Aimee stated she is trying to reach a doctor and they will call me back.

## 2024-12-10 ENCOUNTER — CLINICAL SUPPORT (OUTPATIENT)
Dept: SMOKING CESSATION | Facility: CLINIC | Age: 59
End: 2024-12-10
Payer: COMMERCIAL

## 2024-12-10 DIAGNOSIS — I50.22 CHRONIC SYSTOLIC CONGESTIVE HEART FAILURE: Primary | ICD-10-CM

## 2024-12-10 DIAGNOSIS — F17.200 NICOTINE DEPENDENCE: Primary | ICD-10-CM

## 2024-12-10 PROCEDURE — 99999 PR PBB SHADOW E&M-EST. PATIENT-LVL I: CPT | Mod: PBBFAC,TXP,,

## 2024-12-10 PROCEDURE — 99407 BEHAV CHNG SMOKING > 10 MIN: CPT | Mod: S$GLB,TXP,,

## 2024-12-10 RX ORDER — ISOSORBIDE DINITRATE 20 MG/1
40 TABLET ORAL 3 TIMES DAILY
Qty: 180 TABLET | Refills: 3 | Status: SHIPPED | OUTPATIENT
Start: 2025-01-06

## 2024-12-10 RX ORDER — CARVEDILOL 25 MG/1
50 TABLET ORAL 2 TIMES DAILY
Qty: 180 TABLET | Refills: 3 | Status: SHIPPED | OUTPATIENT
Start: 2024-12-10

## 2024-12-10 RX ORDER — HYDRALAZINE HYDROCHLORIDE 25 MG/1
50 TABLET, FILM COATED ORAL 3 TIMES DAILY
Qty: 270 TABLET | Refills: 3 | Status: SHIPPED | OUTPATIENT
Start: 2024-12-23

## 2024-12-10 NOTE — PROGRESS NOTES
Called pt to f/u on his 3 month smoking cessation quit status. Pt stated he remains tobacco free. Pt was a 2 ppd smoker. Congratulated him on his hard work and success. Pt is actively enrolled in program with f/u scheduled with [facility]. Informed him of benefit period, phone follow ups, and contact information. Will complete smart form and will continue to follow up on quit #1 episode.

## 2024-12-23 ENCOUNTER — CLINICAL SUPPORT (OUTPATIENT)
Dept: SMOKING CESSATION | Facility: CLINIC | Age: 59
End: 2024-12-23
Payer: COMMERCIAL

## 2024-12-23 DIAGNOSIS — F17.200 NICOTINE DEPENDENCE: Primary | ICD-10-CM

## 2024-12-23 PROCEDURE — 99999 PR PBB SHADOW E&M-EST. PATIENT-LVL II: CPT | Mod: PBBFAC,TXP,,

## 2024-12-23 PROCEDURE — 99402 PREV MED CNSL INDIV APPRX 30: CPT | Mod: S$GLB,TXP,, | Performed by: GENERAL PRACTICE

## 2024-12-23 NOTE — PROGRESS NOTES
Individual Follow-Up Form    12/23/2024    Quit Date: TBD    Clinical Status of Patient: Outpatient    Length of Service: 30 minutes    Continuing Medication: yes  Patches    Other Medications: nicotine gum     Target Symptoms: Withdrawal and medication side effects. The following were  rated moderate (3) to severe (4) on TCRS:  Moderate (3): none  Severe (4): none    Comments: Spoke to patient over the phone in regard to his smoking cessation progress. Patient states he is not smoke free at this time and smokes every now and then. He is not having strong cravings to smoke. Discussed health effects of smoking and personal reasons for quitting. Patient states he is using 14 mg nicotine patch QD and 2 mg nicotine gum as needed with no side effects at this time. Patient is not ready to set a quit date at this time. The patient denies any abnormal behavioral or mental changes at this time.  Will continue to encourage and monitor his progress.     Diagnosis: F17.200    Next Visit: 2 weeks

## 2025-01-06 ENCOUNTER — CLINICAL SUPPORT (OUTPATIENT)
Dept: SMOKING CESSATION | Facility: CLINIC | Age: 60
End: 2025-01-06
Payer: COMMERCIAL

## 2025-01-06 DIAGNOSIS — F17.200 NICOTINE DEPENDENCE: Primary | ICD-10-CM

## 2025-01-06 PROCEDURE — 99402 PREV MED CNSL INDIV APPRX 30: CPT | Mod: S$GLB,,, | Performed by: GENERAL PRACTICE

## 2025-01-06 PROCEDURE — 99999 PR PBB SHADOW E&M-EST. PATIENT-LVL I: CPT | Mod: PBBFAC,,,

## 2025-01-06 NOTE — PROGRESS NOTES
Individual Follow-Up Form    1/6/2025    Quit Date: 2 weeks smoke free    Clinical Status of Patient: Outpatient    Length of Service: 30 minutes    Continuing Medication: yes  Patches    Other Medications: no     Target Symptoms: Withdrawal and medication side effects. The following were  rated moderate (3) to severe (4) on TCRS:  Moderate (3): none  Severe (4): none    Comments: Spoke to Aimee and patient in background on telephone call. Patient is in hospital at this time and being discharged today. Aimee states he had a cold and was wheezing. He has not smoked in 2 weeks. Commended patient on his accomplishment. He is currently using a 14 mg nicotine patch. Will follow up with patient next week after he is home after discharge from hospital for medication management and to continue following up with him on his smoking cessation progress.     Diagnosis: F17.200    Next Visit: 1 week

## 2025-01-07 ENCOUNTER — TELEPHONE (OUTPATIENT)
Dept: TRANSPLANT | Facility: CLINIC | Age: 60
End: 2025-01-07
Payer: MEDICAID

## 2025-01-07 NOTE — TELEPHONE ENCOUNTER
Reviewed patient chart. Patient admitted to [facility] 1/5/25 for sob and heart failure. Labs WNL for patient. Patient has f/u appt. Scheduled with Dr. Rudd for 2/12/25.

## 2025-01-14 ENCOUNTER — TELEPHONE (OUTPATIENT)
Dept: SMOKING CESSATION | Facility: CLINIC | Age: 60
End: 2025-01-14
Payer: MEDICAID

## 2025-01-14 NOTE — TELEPHONE ENCOUNTER
Attempted to follow up with patient in regard to his smoking cessation progress. Left voicemail with return contact information.

## 2025-02-03 ENCOUNTER — CLINICAL SUPPORT (OUTPATIENT)
Dept: SMOKING CESSATION | Facility: CLINIC | Age: 60
End: 2025-02-03
Payer: COMMERCIAL

## 2025-02-03 DIAGNOSIS — F17.200 NICOTINE DEPENDENCE: Primary | ICD-10-CM

## 2025-02-03 PROCEDURE — 99999 PR PBB SHADOW E&M-EST. PATIENT-LVL I: CPT | Mod: PBBFAC,,,

## 2025-02-03 PROCEDURE — 99402 PREV MED CNSL INDIV APPRX 30: CPT | Mod: S$GLB,,, | Performed by: GENERAL PRACTICE

## 2025-02-03 NOTE — PROGRESS NOTES
Individual Follow-Up Form    2/3/2025    Quit Date: TBD    Clinical Status of Patient: Outpatient    Length of Service: 30 minutes    Continuing Medication: yes  Patches    Other Medications: nicotine gum     Target Symptoms: Withdrawal and medication side effects. The following were  rated moderate (3) to severe (4) on TCRS:  Moderate (3): none  Severe (4): none    Comments: Followed up with patient in regard to missed appointment. Discussed with him his smoking cessation progress. Patient states he smokes on some days. Patient reports his recent hospitalization was from fluid around the heart. Discussed the health effects of smoking and the importance of eliminating cigarettes. He continues to use 14 mg nicotine patch QD and 2 mg nicotine gum as needed. Challenged patient to get rid of cigarettes. Patient states he does not need refill of nicotine patches at this time. The patient denies any abnormal behavioral or mental changes at this time.  Will continue to encourage and monitor his progress.     Diagnosis: F17.200    Next Visit: 2 weeks

## 2025-02-17 ENCOUNTER — CLINICAL SUPPORT (OUTPATIENT)
Dept: SMOKING CESSATION | Facility: CLINIC | Age: 60
End: 2025-02-17
Payer: COMMERCIAL

## 2025-02-17 DIAGNOSIS — F17.200 NICOTINE DEPENDENCE: Primary | ICD-10-CM

## 2025-03-05 ENCOUNTER — CLINICAL SUPPORT (OUTPATIENT)
Dept: SMOKING CESSATION | Facility: CLINIC | Age: 60
End: 2025-03-05
Payer: COMMERCIAL

## 2025-03-05 DIAGNOSIS — F17.200 NICOTINE DEPENDENCE: Primary | ICD-10-CM

## 2025-03-05 PROCEDURE — 99407 BEHAV CHNG SMOKING > 10 MIN: CPT | Mod: S$GLB,,, | Performed by: GENERAL PRACTICE

## 2025-03-05 PROCEDURE — 99999 PR PBB SHADOW E&M-EST. PATIENT-LVL I: CPT | Mod: PBBFAC,,,

## 2025-03-05 NOTE — PROGRESS NOTES
Spoke with patient today in regard to smoking cessation progress for 6 month telephone follow up. Patient states he quit smoking because he was having trouble breathing. Nicotine patches and gum helped him with quitting. Commended patient on his efforts towards quitting. Informed patient of benefit period, future follow ups, and contact information if any further help or support is needed. Will complete smart form for 6 month follow up on Quit attempt #1.

## 2025-03-21 ENCOUNTER — TELEPHONE (OUTPATIENT)
Dept: TRANSPLANT | Facility: CLINIC | Age: 60
End: 2025-03-21
Payer: MEDICAID

## 2025-03-21 NOTE — TELEPHONE ENCOUNTER
Reminder call placed to patient regarding appointment in AHF clinic on 03/21. Patient confirmed appointment date/time.

## 2025-05-06 ENCOUNTER — LAB VISIT (OUTPATIENT)
Dept: LAB | Facility: HOSPITAL | Age: 60
End: 2025-05-06
Attending: INTERNAL MEDICINE
Payer: MEDICAID

## 2025-05-06 ENCOUNTER — OFFICE VISIT (OUTPATIENT)
Dept: TRANSPLANT | Facility: CLINIC | Age: 60
End: 2025-05-06
Payer: MEDICAID

## 2025-05-06 ENCOUNTER — PATIENT MESSAGE (OUTPATIENT)
Dept: CARDIOLOGY | Facility: HOSPITAL | Age: 60
End: 2025-05-06
Payer: MEDICAID

## 2025-05-06 VITALS
WEIGHT: 141.56 LBS | OXYGEN SATURATION: 98 % | BODY MASS INDEX: 20.9 KG/M2 | HEART RATE: 75 BPM | SYSTOLIC BLOOD PRESSURE: 130 MMHG | DIASTOLIC BLOOD PRESSURE: 72 MMHG

## 2025-05-06 DIAGNOSIS — I50.22 CHRONIC SYSTOLIC CONGESTIVE HEART FAILURE: ICD-10-CM

## 2025-05-06 DIAGNOSIS — Z01.89 NEEDS SLEEP APNEA ASSESSMENT: ICD-10-CM

## 2025-05-06 DIAGNOSIS — I10 PRIMARY HYPERTENSION: ICD-10-CM

## 2025-05-06 DIAGNOSIS — I50.22 CHRONIC SYSTOLIC CONGESTIVE HEART FAILURE: Primary | ICD-10-CM

## 2025-05-06 DIAGNOSIS — I42.8 NICM (NONISCHEMIC CARDIOMYOPATHY): ICD-10-CM

## 2025-05-06 DIAGNOSIS — J44.9 COPD MIXED TYPE: ICD-10-CM

## 2025-05-06 LAB
ANION GAP (OHS): 12 MMOL/L (ref 8–16)
BNP SERPL-MCNC: 158 PG/ML (ref 0–99)
BUN SERPL-MCNC: 17 MG/DL (ref 6–20)
CALCIUM SERPL-MCNC: 9.4 MG/DL (ref 8.7–10.5)
CHLORIDE SERPL-SCNC: 98 MMOL/L (ref 95–110)
CO2 SERPL-SCNC: 31 MMOL/L (ref 23–29)
CREAT SERPL-MCNC: 1.3 MG/DL (ref 0.5–1.4)
GFR SERPLBLD CREATININE-BSD FMLA CKD-EPI: >60 ML/MIN/1.73/M2
GLUCOSE SERPL-MCNC: 91 MG/DL (ref 70–110)
POTASSIUM SERPL-SCNC: 3.2 MMOL/L (ref 3.5–5.1)
SODIUM SERPL-SCNC: 141 MMOL/L (ref 136–145)

## 2025-05-06 PROCEDURE — 82374 ASSAY BLOOD CARBON DIOXIDE: CPT

## 2025-05-06 PROCEDURE — 99213 OFFICE O/P EST LOW 20 MIN: CPT | Mod: PBBFAC | Performed by: INTERNAL MEDICINE

## 2025-05-06 PROCEDURE — 99999 PR PBB SHADOW E&M-EST. PATIENT-LVL III: CPT | Mod: PBBFAC,,, | Performed by: INTERNAL MEDICINE

## 2025-05-06 PROCEDURE — 36415 COLL VENOUS BLD VENIPUNCTURE: CPT

## 2025-05-06 PROCEDURE — 83880 ASSAY OF NATRIURETIC PEPTIDE: CPT

## 2025-05-06 RX ORDER — FUROSEMIDE 40 MG/1
40 TABLET ORAL EVERY MORNING
COMMUNITY
End: 2025-05-06
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
## 2025-05-06 NOTE — Clinical Note
Hello heart failure team, this gentleman is a little confused on how he takes his medications, he is going to call [phone] and go over how many times a day he takes his meds, specifically bumex, aldactone, potassium, and also confirm he says his isordil causes headaches ( am guessing it is this one, he is going to make sure which it is). Thank you very much!!

## 2025-05-06 NOTE — Clinical Note
Hey guys, can we get clinic visit in [place] with cmp in 4 months, RHC/cpx/tte in 5 months or so please. Thank you!

## 2025-05-06 NOTE — PATIENT INSTRUCTIONS
Take bumex 2mg twice a day.    Take potassium one tablet twice a day.    Call us to go over your medications and confirm dosing.     [phone]

## 2025-05-07 ENCOUNTER — PATIENT MESSAGE (OUTPATIENT)
Dept: PULMONOLOGY | Facility: CLINIC | Age: 60
End: 2025-05-07
Payer: MEDICAID

## 2025-05-12 ENCOUNTER — TELEPHONE (OUTPATIENT)
Dept: TRANSPLANT | Facility: CLINIC | Age: 60
End: 2025-05-12
Payer: MEDICAID

## 2025-05-12 DIAGNOSIS — I42.8 NICM (NONISCHEMIC CARDIOMYOPATHY): ICD-10-CM

## 2025-05-12 NOTE — TELEPHONE ENCOUNTER
5/9/25 - Received below written orders from SHANTEL Monk M.D.    Did not receive return call back from patient.    ----- Message from Angelita Monk MD sent at 5/6/2025  1:30 PM CDT -----  [facility] heart [facility] team, this gentleman is a little confused on how he takes his medications, he is going to call [phone] and go over how many times a day he takes his meds, specifically bumex, aldactone, potassium, and also confirm he says his isordil causes headaches ( am guessing it is this one, he is going to make sure which it is). Thank you very much!!    Called/spoke with patient's wife and instructed her I was calling from Dr. Monk's office to get a list of medications the patient is taking.  Wife started listing all of patient's medications.  Wife reported patient is taking Bumex and Lasix.  I asked wife if patient was actually taking both Bumex and Lasix and wife stated no, patient is only taking Bumex now, not Lasix.  I reiterated to wife I only want the list of medications the patient is currently taking.  Wife states she does not have that list with her, she isn't home and wasn't going to be home until later that afternoon.  I explained to wife I was leaving for the day and I could call her back Monday.  Wife verbalized understanding and stated she would be home.      5/12/25 - Called/spoke with wife and informed her I was calling to get the list of medications the patient is currently taking.  Wife again stated she was not home, but will call me when she gets home.  Wife asked for the number.  I gave the number, wife wrote it down along with my name and repeated both the number and my name back.     Still awaiting call back.

## 2025-05-16 ENCOUNTER — TELEPHONE (OUTPATIENT)
Dept: CARDIOLOGY | Facility: CLINIC | Age: 60
End: 2025-05-16
Payer: MEDICAID

## 2025-05-16 NOTE — TELEPHONE ENCOUNTER
----- Message from Riana sent at 5/16/2025  8:12 AM CDT -----  Type:  Patient Requesting a call back Who Called: Aimee What is the call back request regarding?:would like to reschedule appt that was for today 5/16/25 Would the patient rather a call back or a response via [facility] Call Back Number:[phone] Additional Information:

## 2025-05-19 ENCOUNTER — HOSPITAL ENCOUNTER (OUTPATIENT)
Dept: CARDIOLOGY | Facility: HOSPITAL | Age: 60
Discharge: HOME | End: 2025-05-19
Attending: INTERNAL MEDICINE
Payer: MEDICAID

## 2025-05-19 VITALS
WEIGHT: 141 LBS | HEIGHT: 69 IN | SYSTOLIC BLOOD PRESSURE: 90 MMHG | DIASTOLIC BLOOD PRESSURE: 66 MMHG | BODY MASS INDEX: 20.88 KG/M2

## 2025-05-19 DIAGNOSIS — I50.22 CHRONIC SYSTOLIC CONGESTIVE HEART FAILURE: ICD-10-CM

## 2025-05-19 LAB
AORTIC ROOT ANNULUS: 3.3 CM
AORTIC SIZE INDEX (SOV): 1.6 CM/M2
AV INDEX (PROSTH): 0.84
AV MEAN GRADIENT: 2 MMHG
AV PEAK GRADIENT: 3 MMHG
AV VALVE AREA BY VELOCITY RATIO: 2.8 CM²
AV VALVE AREA: 2.6 CM²
AV VELOCITY RATIO: 0.89
BSA FOR ECHO PROCEDURE: 1.76 M2
CV ECHO LV RWT: 0.4 CM
DOP CALC AO PEAK VEL: 0.9 M/S
DOP CALC AO VTI: 17.9 CM
DOP CALC LVOT AREA: 3.1 CM2
DOP CALC LVOT DIAMETER: 2 CM
DOP CALC LVOT PEAK VEL: 0.8 M/S
DOP CALC LVOT STROKE VOLUME: 47.4 CM3
DOP CALC RVOT PEAK VEL: 0.77 M/S
DOP CALC RVOT VTI: 13.7 CM
DOP CALCLVOT PEAK VEL VTI: 15.1 CM
E WAVE DECELERATION TIME: 158 MSEC
E/A RATIO: 1.15
E/E' RATIO: 11 M/S
ECHO LV POSTERIOR WALL: 1.2 CM (ref 0.6–1.1)
FRACTIONAL SHORTENING: 8.3 % (ref 28–44)
INTERVENTRICULAR SEPTUM: 1 CM (ref 0.6–1.1)
LA MAJOR: 6.4 CM
LA MINOR: 5.9 CM
LA WIDTH: 4.6 CM
LEFT ATRIUM AREA SYSTOLIC (APICAL 2 CHAMBER): 28.59 CM2
LEFT ATRIUM AREA SYSTOLIC (APICAL 4 CHAMBER): 30.91 CM2
LEFT ATRIUM SIZE: 3.8 CM
LEFT ATRIUM VOLUME INDEX MOD: 63 ML/M2
LEFT ATRIUM VOLUME INDEX: 51 ML/M2
LEFT ATRIUM VOLUME MOD: 112 ML
LEFT ATRIUM VOLUME: 91 CM3
LEFT INTERNAL DIMENSION IN SYSTOLE: 5.5 CM (ref 2.1–4)
LEFT VENTRICLE DIASTOLIC VOLUME INDEX: 102.81 ML/M2
LEFT VENTRICLE DIASTOLIC VOLUME: 183 ML
LEFT VENTRICLE END SYSTOLIC VOLUME APICAL 2 CHAMBER: 111.49 ML
LEFT VENTRICLE END SYSTOLIC VOLUME APICAL 4 CHAMBER: 111.39 ML
LEFT VENTRICLE MASS INDEX: 157.1 G/M2
LEFT VENTRICLE SYSTOLIC VOLUME INDEX: 80.9 ML/M2
LEFT VENTRICLE SYSTOLIC VOLUME: 144 ML
LEFT VENTRICULAR INTERNAL DIMENSION IN DIASTOLE: 6 CM (ref 3.5–6)
LEFT VENTRICULAR MASS: 279.6 G
LV LATERAL E/E' RATIO: 10.4 M/S
LV SEPTAL E/E' RATIO: 11.9 M/S
LVED V (TEICH): 183.01 ML
LVES V (TEICH): 144.43 ML
LVOT MG: 1.15 MMHG
LVOT MV: 0.49 CM/S
MV PEAK A VEL: 0.72 M/S
MV PEAK E VEL: 0.83 M/S
MV STENOSIS PRESSURE HALF TIME: 45.77 MS
MV VALVE AREA P 1/2 METHOD: 4.81 CM2
OHS CV RV/LV RATIO: 0.5 CM
PISA TR MAX VEL: 2.7 M/S
PV MEAN GRADIENT: 1 MMHG
PV MV: 0.38 M/S
PV PEAK GRADIENT: 2 MMHG
PV PEAK VELOCITY: 0.62 M/S
RA MAJOR: 4.29 CM
RA PRESSURE ESTIMATED: 3 MMHG
RA WIDTH: 3.83 CM
RIGHT VENTRICLE DIASTOLIC BASEL DIMENSION: 3 CM
RIGHT VENTRICULAR END-DIASTOLIC DIMENSION: 3.04 CM
RV TB RVSP: 6 MMHG
SINUS: 2.91 CM
STJ: 2.5 CM
TDI LATERAL: 0.08 M/S
TDI SEPTAL: 0.07 M/S
TDI: 0.08 M/S
TR MAX PG: 44 MMHG
TRICUSPID ANNULAR PLANE SYSTOLIC EXCURSION: 2.2 CM
TV REST PULMONARY ARTERY PRESSURE: 32 MMHG
Z-SCORE OF LEFT VENTRICULAR DIMENSION IN END DIASTOLE: 1.98
Z-SCORE OF LEFT VENTRICULAR DIMENSION IN END SYSTOLE: 4.68

## 2025-05-19 PROCEDURE — 93306 TTE W/DOPPLER COMPLETE: CPT | Mod: 26,,, | Performed by: INTERNAL MEDICINE

## 2025-05-19 PROCEDURE — 93306 TTE W/DOPPLER COMPLETE: CPT

## 2025-05-20 ENCOUNTER — TELEPHONE (OUTPATIENT)
Dept: TRANSPLANT | Facility: CLINIC | Age: 60
End: 2025-05-20
Payer: MEDICAID

## 2025-05-20 NOTE — TELEPHONE ENCOUNTER
5/20/25 - Received below message from patient requesting a call back.    Attempted to call patient, N/A, left v-mail with my name and call back number with message stating I'm returning his call.    ----- Message -----  From: Marilynn Perez  Sent: 5/20/2025   8:49 AM CDT  To: Lacho Goldstein V Staff    Type:  Test ResultsWho Called: miltonName of Test (Lab/Mammo/Etc): EchoDate of Test: 05/19/25Ordering Provider: LachoWhere the test was performed[facility] or a response via [facility]? [facility] Call Back Number: [phone] Information:  Calling to get results    Received return call back from patient's fiance' and patient was in the back ground.  Informed both of results of Echo, compared to previous it is essentially unchanged.  They both verbalized understanding.

## 2025-07-01 ENCOUNTER — TELEPHONE (OUTPATIENT)
Dept: TRANSPLANT | Facility: CLINIC | Age: 60
End: 2025-07-01
Payer: MEDICAID

## (undated) DEVICE — TRANSDUCER ADULT DISP

## (undated) DEVICE — CATH SWAN GANZ STND 7FR

## (undated) DEVICE — TRAY CATH LAB OMC

## (undated) DEVICE — COVER PROBE US 5.5X58L NON LTX

## (undated) DEVICE — SHEATH INTRODUCER 7FR 11CM

## (undated) DEVICE — SET CO-SET CLOSED INJ

## (undated) DEVICE — KIT MINI STICK MAX INTRO 5FR